# Patient Record
Sex: FEMALE | Race: WHITE | NOT HISPANIC OR LATINO
[De-identification: names, ages, dates, MRNs, and addresses within clinical notes are randomized per-mention and may not be internally consistent; named-entity substitution may affect disease eponyms.]

---

## 2023-04-05 ENCOUNTER — NON-APPOINTMENT (OUTPATIENT)
Age: 48
End: 2023-04-05

## 2023-04-05 ENCOUNTER — APPOINTMENT (OUTPATIENT)
Dept: ORTHOPEDIC SURGERY | Facility: CLINIC | Age: 48
End: 2023-04-05
Payer: COMMERCIAL

## 2023-04-05 VITALS — BODY MASS INDEX: 24.16 KG/M2 | HEIGHT: 65 IN | WEIGHT: 145 LBS

## 2023-04-05 DIAGNOSIS — M65.4 RADIAL STYLOID TENOSYNOVITIS [DE QUERVAIN]: ICD-10-CM

## 2023-04-05 PROBLEM — Z00.00 ENCOUNTER FOR PREVENTIVE HEALTH EXAMINATION: Status: ACTIVE | Noted: 2023-04-05

## 2023-04-05 PROCEDURE — 99203 OFFICE O/P NEW LOW 30 MIN: CPT

## 2023-04-05 NOTE — PHYSICAL EXAM
[de-identified] : Physical exam  of her left wrist: Mild swelling.  Negative ecchymosis.  Nontender over the distal radius or distal ulna.  Negative anatomical snuffbox tenderness.  Very mild pain of the DRUJ.  Most of her pain is localized over the first dorsal compartment.  Positive wrist tenderness.  Positive Finkelstein.  She can make a full fist.  Sensory and motor are intact.

## 2023-04-05 NOTE — HISTORY OF PRESENT ILLNESS
[de-identified] : Patient is a 47-year-old female here for evaluation of her left wrist.  She has been having pain since October 2022.  She denies any new or recent trauma.  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  She saw an orthopedist who sent her for physical therapy.  She has been doing therapy from January and now with mild relief in her symptoms.  She has not tried an anti-inflammatory.  She recently had an MRI done and was told to follow-up with a hand specialist.

## 2023-04-05 NOTE — DISCUSSION/SUMMARY
[de-identified] : I explained the MRI findings to the patient.\par We discussed treatment options including anti-inflammatories, continuing therapy, thumb spica brace, and possibly a cortisone injection in the first dorsal compartment.\par Clinically, she is most tender over the first dorsal compartment and radial styloid.\par The risks and benefits of a cortisone were explained to the patient.  She would like to proceed with the injection.  Today under sterile technique with the patient's consent, I injected 2 cc of dexamethasone and 1 cc of 1% lidocaine into the first dorsal compartment of the right wrist she tolerated this well.  The puncture site was covered with a Band-Aid.  She was instructed to ice and rest her thumb tonight.\par I gave her a thumb spica brace for immobilization and support.\par I wrote a prescription for meloxicam 15 mg 1 tab once a day for the pain and inflammation.\par She will follow-up with Dr. Burdick in 4 to 6 weeks for repeat evaluation.  We discussed the option of a second injection and possibly a decrements release in the future if she has 2 failed cortisone injections.\par All questions were answered today.  She will contact the office with any questions or concerns.

## 2023-04-05 NOTE — DATA REVIEWED
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  It shows a 1.6 cm ganglion cyst volar to the scaphoid trapezial joint.  It also shows mild soft tissue edema about the left wrist and a small effusion within the DRUJ with mild subluxation.  No fracture or contusion pattern seen.  There is also a grade 1 sprain of the DRUJ capsule.

## 2023-05-03 RX ORDER — MELOXICAM 15 MG/1
15 TABLET ORAL DAILY
Qty: 30 | Refills: 2 | Status: ACTIVE | COMMUNITY
Start: 2023-04-11 | End: 1900-01-01

## 2023-05-12 ENCOUNTER — APPOINTMENT (OUTPATIENT)
Dept: ORTHOPEDIC SURGERY | Facility: CLINIC | Age: 48
End: 2023-05-12